# Patient Record
Sex: MALE | Race: WHITE
[De-identification: names, ages, dates, MRNs, and addresses within clinical notes are randomized per-mention and may not be internally consistent; named-entity substitution may affect disease eponyms.]

---

## 2019-12-09 ENCOUNTER — HOSPITAL ENCOUNTER (EMERGENCY)
Dept: HOSPITAL 62 - ER | Age: 19
LOS: 1 days | Discharge: HOME | End: 2019-12-10
Payer: OTHER GOVERNMENT

## 2019-12-09 DIAGNOSIS — F17.200: ICD-10-CM
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
## 2019-12-09 DIAGNOSIS — S60.571A: Primary | ICD-10-CM
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
## 2019-12-09 PROCEDURE — 99283 EMERGENCY DEPT VISIT LOW MDM: CPT

## 2019-12-09 NOTE — RADIOLOGY REPORT (SQ)
EXAM DESCRIPTION: 



XR HAND 3 OR MORE VIEWS



COMPLETED DATE/TME:  12/09/2019 00:00



CLINICAL HISTORY: 



19 years, Male, FOREIGN BODY 



COMPARISON:

None.



NUMBER OF VIEWS:

3



TECHNIQUE:

3 view right hand



LIMITATIONS:

None.



FINDINGS:



Negative for fracture or dislocation. No radiopaque foreign body.

Soft tissues are unremarkable



IMPRESSION:



Negative exam

 



copyright 2011 [employer]- All Rights Reserved

## 2019-12-09 NOTE — ER DOCUMENT REPORT
HPI





- HPI


Time Seen by Provider: 12/09/19 22:36


Pain Level: 3


Notes: 





19-year-old male patient presenting to the ED with complaints of dog bite to his

right hand.  Patient reports it was his dog, states the dog's immunizations are 

all up-to-date and states that the dog's foot was stuck in the kennel and he was

trying to help the dog get out and the dog was scared.  Patient reports his 

immunizations are up-to-date.








- ROS


Systems Reviewed and Negative: Yes All other systems reviewed and negative





Past Medical History





- General


Information source: Patient





- Social History


Smoking Status: Current Every Day Smoker


Frequency of alcohol use: None


Drug Abuse: None


Family History: Reviewed & Not Pertinent


Patient has suicidal ideation: No


Patient has homicidal ideation: No





- Medical History


Medical History: Negative


Surgical Hx: Negative





- Immunizations


Immunizations up to date: Yes


Hx Diphtheria, Pertussis, Tetanus Vaccination: Yes





Vertical Provider Document





- CONSTITUTIONAL


Notes: 





PHYSICAL EXAMINATION:





GENERAL: Well-appearing, well-nourished and in no acute distress.





HEAD: Atraumatic, normocephalic.





EYES: Pupils equal round extraocular movements intact,  conjunctiva are normal.





ENT: Nares patent





NECK: Normal range of motion





LUNGS: No respiratory distress





Musculoskeletal: Normal range of motion





NEUROLOGICAL:  Normal speech, normal gait. 





PSYCH: Normal mood, normal affect.





SKIN: 3 superficial puncture wounds noted to dorsal surface of right hand.  

Strong radial pulse.  Cap refill less than 3 seconds.  Normal motor and 

sensation to right hand and fingers.





- INFECTION CONTROL


TRAVEL OUTSIDE OF THE [place] IN LAST 30 DAYS: No





Course





- Re-evaluation


Re-evalutation: 





Patient with 3 small puncture wounds noted to dorsal surface of right hand.  

Mild swelling noted without erythema.  X-ray was performed as there was a large 

dog that bit him, the x-ray is negative for any fractures.  Patient will be 

discharged home with prescription for Augmentin.





The patient's emergency department workup and current diagnosis were explained 

to the patient and or family.  Follow-up instructions were provided.  

Medications if prescribed were discussed. Instructions for when to return to the

emergency department including specific worrisome symptoms were discussed with 

the patient and/or family.








- Vital Signs


Vital signs: 


                                        











Temp Pulse Resp BP Pulse Ox


 


 98.0 F   60   16   117/70   100 


 


 12/09/19 20:56  12/09/19 20:56  12/09/19 20:56  12/09/19 20:56  12/09/19 20:56














Discharge





- Discharge


Clinical Impression: 


 Dog bite





Condition: Stable


Disposition: HOME, SELF-CARE


Additional Instructions: 


Animal Bites





     Animal bites are often heavily contaminated with bacteria.  In spite of 

thorough cleansing and proper treatment, these wounds frequently become 

infected.  Bite wounds of the hands are especially prone to complications.


     Bites are dressed, if possible.  Large wounds may require suturing after 

internal cleansing.  Because of infection risk, some large wounds must remain 

unstitched.  Your doctor is trained to advise you on the best treatment for your

bite.


     Call the doctor at once if the wound becomes red, swollen, warm, 

increasingly painful, or if it begins to drain.  Danger signs also include red 

streaks up the involved extremity, swollen glands in the groin or under the arm,

or fever and chills.


     The risk of rabies from domestic animals is very low.  Bats, sick animals, 

and wild animals may expose you to rabies.  The physician, or the health 

department, will inform you if you will need to receive the rabies vaccine.





Augmentin


     Augmentin is a mixture of amoxicillin and clavulanate. Amoxicillin is a 

member of the penicillin family.  It covers the germs likely to cause ear, 

bronchial, and urinary infections better than plain penicillin.  The addition of

clavulanate allows it to cover staph infections of the skin, as well as 

resistant cases of ear and sinus infections.  Your physician has chosen 

Augmentin for you because of the special nature of your situation.


     Augmentin is best taken with meals.  Nausea after taking the medication is 

rare, but can occur.  Diarrhea can occur, particularly in small children.  

Vaginal yeast infections, and oral thrush in infants are also common.  Contact 

your physician if these problems occur.


     Allergy to penicillins is common.  If you have had an allergic reaction to 

any drug of the penicillin family, you should never take any other penicillin.  

Notify your doctor at once if you develop hives, shortness of breath, swelling, 

or faintness.








****Please take antibiotics as prescribed.  Watch very closely for signs of 

infection to include increasing swelling, pain, redness, red streaking from the 

area, development of fever or any other worsening symptoms.****








Prescriptions: 


Amox Tr/Potassium Clavulanate [Augmentin 875-125 mg Tablet] 1 tab PO BID #20 

tablet

## 2019-12-10 VITALS — DIASTOLIC BLOOD PRESSURE: 78 MMHG | SYSTOLIC BLOOD PRESSURE: 121 MMHG
